# Patient Record
Sex: FEMALE | Race: WHITE | NOT HISPANIC OR LATINO | URBAN - NONMETROPOLITAN AREA
[De-identification: names, ages, dates, MRNs, and addresses within clinical notes are randomized per-mention and may not be internally consistent; named-entity substitution may affect disease eponyms.]

---

## 2022-12-09 ENCOUNTER — OFFICE VISIT (OUTPATIENT)
Dept: OTOLARYNGOLOGY | Facility: CLINIC | Age: 57
End: 2022-12-09

## 2022-12-09 VITALS — HEIGHT: 60 IN | OXYGEN SATURATION: 98 % | WEIGHT: 137.2 LBS | HEART RATE: 84 BPM | BODY MASS INDEX: 26.93 KG/M2

## 2022-12-09 DIAGNOSIS — H61.21 IMPACTED CERUMEN OF RIGHT EAR: ICD-10-CM

## 2022-12-09 DIAGNOSIS — H69.82 DYSFUNCTION OF LEFT EUSTACHIAN TUBE: Primary | ICD-10-CM

## 2022-12-09 PROCEDURE — 69210 REMOVE IMPACTED EAR WAX UNI: CPT | Performed by: OTOLARYNGOLOGY

## 2022-12-09 PROCEDURE — 99203 OFFICE O/P NEW LOW 30 MIN: CPT | Performed by: OTOLARYNGOLOGY

## 2022-12-09 RX ORDER — FLUTICASONE PROPIONATE 50 MCG
2 SPRAY, SUSPENSION (ML) NASAL DAILY
Qty: 16 G | Refills: 5 | Status: SHIPPED | OUTPATIENT
Start: 2022-12-09

## 2022-12-09 RX ORDER — LOSARTAN POTASSIUM 25 MG/1
TABLET ORAL
COMMUNITY
Start: 2022-09-15

## 2022-12-09 NOTE — PROGRESS NOTES
Chief complaint: wax    Assessment and Plan:  57F with mild left ETD, right cerumen impaction, removed today    -Avoid Q-tips or other things within the ear canals bilaterally  -By Flonase angled posterolaterally towards ipsilateral ear 1 spray on each side 1 time daily for 1 month to see if this helps with her mild left eustachian tube dysfunction  -Follow-up as needed or if persistent left ear concerns    History of present illness:    Ms. Morales is a 57-year-old female presenting today for evaluation of wax.  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  She denies any hearing change she denies any tinnitus.  She does not use any nasal sprays and denies any nasal congestion or runny nose.  She was seen by her primary care APRN and noted to have wax and then was referred to ENT for further evaluation.  No other acute ENT concerns today.    Vital Signs   Vitals:    12/09/22 0933   Pulse: 84   SpO2: 98%       Physical Exam:  General: NAD, awake and alert  Head: normocephalic, atraumatic  Eyes: EOMI, sclerae white, conjunctivae pink  Ears: pinnae intact without masses or lesions   Right: TM intact without injection or effusion, obscured by 90% cerumen impaction, removed   Left: TM intact without injection or effusion  Nose: external straight without deformity  OC/OP: mucosa moist and pink  Neck: supple, no masses or lesions. Thyroid without palpable masses.  Neuro: no focal deficits    Procedure: Removal of impacted cerumen, right  Indication: impacted cerumen, right  EBL: None  Anesthesia: None  Complications: None  Surgeon: Nay Alvarez MD    Description:  After informed consent was verbally obtained from the patient, they are placed  in a reclined position with the head turned to the contralateral side.  Using a binocular microscope and a 4 mm ear speculum The right canal was cleared of wax using wire loop.  The same was performed on the contralateral side.  The patient tolerated the procedure well and without known complications.  This concluded the procedure.    Results Review:  None    Review of Systems:  Positive ROS items: Ear pain and dizziness  Otherwise, a 14 system ROS is negative except as pertinent positives are mentioned above.    Histories:  Allergies   Allergen Reactions   • Lortab [Hydrocodone-Acetaminophen]    • Penicillins        Prior to Admission medications    Medication Sig Start Date End Date Taking? Authorizing Provider   ACETAMINOPHEN PO Take  by mouth. 7/28/13  Yes Lashaun Dougherty MD   IBUPROFEN PO Take  by mouth. 7/28/13  Yes Lashaun Dougherty MD   losartan (COZAAR) 25 MG tablet  9/15/22  Yes Lashaun Dougherty MD   SUMAtriptan (IMITREX) 50 MG tablet Take 50 mg by mouth Every 2 (Two) Hours As Needed for Migraine. Take one tablet at onset of headache. May repeat dose one time in 2 hours if headache not relieved. 1/14/15   ProviderLashaun MD       Past Medical History:   Diagnosis Date   • Headache        No past surgical history on file.    Social History     Socioeconomic History   • Marital status:        No family history on file.          This document has been electronically signed by Nay Alvarez MD on December 9, 2022 09:35 CST